# Patient Record
Sex: FEMALE | Employment: FULL TIME | URBAN - METROPOLITAN AREA
[De-identification: names, ages, dates, MRNs, and addresses within clinical notes are randomized per-mention and may not be internally consistent; named-entity substitution may affect disease eponyms.]

---

## 2017-03-13 RX ORDER — TOPIRAMATE 100 MG/1
TABLET, COATED ORAL
Qty: 135 TAB | Refills: 1 | Status: SHIPPED | OUTPATIENT
Start: 2017-03-13 | End: 2017-08-24 | Stop reason: SDUPTHER

## 2017-03-13 NOTE — TELEPHONE ENCOUNTER
Future Appointments  Date Time Provider Yani Janis   8/18/2017 8:40 Carmita Gutiérrez MD [address]                         Last Appointment My Department:  8/23/2016    Please advise of refill below.   Requested Prescriptions     Pending Prescriptions Disp Refills    TOPAMAX 100 mg tablet [Pharmacy Med Name: TOPAMAX 100MG TABLETS] 135 Tab 1     Sig: TAKE 1& 1/2 TABLETS BY MOUTH NIGHTLY

## 2017-06-12 ENCOUNTER — DOCUMENTATION ONLY (OUTPATIENT)
Dept: NEUROLOGY | Age: 52
End: 2017-06-12

## 2017-06-12 RX ORDER — BUTALBITAL, ASPIRIN, AND CAFFEINE 325; 50; 40 MG/1; MG/1; MG/1
CAPSULE ORAL
Qty: 30 CAP | Refills: 0 | Status: SHIPPED | OUTPATIENT
Start: 2017-06-12 | End: 2018-01-30 | Stop reason: SDUPTHER

## 2017-08-18 ENCOUNTER — TELEPHONE (OUTPATIENT)
Dept: NEUROLOGY | Age: 52
End: 2017-08-18

## 2017-08-18 NOTE — LETTER
NOTIFICATION RETURN TO WORK / SCHOOL 
 
8/18/2017 8:13 AM 
 
Ms. Corky Romo [address] [facility] [address] [address] To Whom It May Concern: 
 
Corky Romo is currently under the care of [address] [place] [address]. She will return to work/school on: 8/18/17 If there are questions or concerns please have the patient contact our office. Sincerely, Chon Ward MD

## 2017-08-18 NOTE — LETTER
NOTIFICATION RETURN TO WORK / SCHOOL 
 
8/18/2017 8:14 AM 
 
Ms. Masoud Lerma [address] [facility] 250 [address] [address] To Whom It May Concern: 
 
Masoud Lerma is currently under the care of [address] [place] [address]. She will return to work/school on: 8/18/17 If there are questions or concerns please have the patient contact our office.  
 
 
 
Sincerely, 
 
 
Laura Brooks LPN

## 2017-08-24 RX ORDER — TOPIRAMATE 100 MG/1
TABLET, COATED ORAL
Qty: 135 TAB | Refills: 0 | Status: SHIPPED | OUTPATIENT
Start: 2017-08-24 | End: 2018-01-28 | Stop reason: SDUPTHER

## 2017-08-29 ENCOUNTER — OFFICE VISIT (OUTPATIENT)
Dept: NEUROLOGY | Age: 52
End: 2017-08-29

## 2017-08-29 DIAGNOSIS — G43.109 MIGRAINE WITH AURA AND WITHOUT STATUS MIGRAINOSUS, NOT INTRACTABLE: Primary | ICD-10-CM

## 2017-08-29 NOTE — PATIENT INSTRUCTIONS
[facility]   Statement to Patients  April 1, 2014      In an effort to ensure the large volume of patient prescription refills is processed in the most efficient and expeditious manner, we are asking our patients to assist us by calling your Pharmacy for all prescription refills, this will include also your  Mail Order Pharmacy. The pharmacy will contact our office electronically to continue the refill process. Please do not wait until the last minute to call your pharmacy. We need at least 48 hours (2days) to fill prescriptions. We also encourage you to call your pharmacy before going to  your prescription to make sure it is ready. With regard to controlled substance prescription refill requests (narcotic refills) that need to be picked up at our office, we ask your cooperation by providing us with at least 72 hours (3days) notice that you will need a refill. We will not refill narcotic prescription refill requests after 4:00pm on any weekday, Monday through Thursday, or after 2:00pm on Fridays, or on the weekends. We encourage everyone to explore another way of getting your prescription refill request processed using [employer], our patient web portal through our electronic medical record system. [employer] is an efficient and effective way to communicate your medication request directly to the office and  downloadable as an nikkie on your smart phone . [employer] also features a review functionality that allows you to view your medication list as well as leave messages for your physician. Are you ready to get connected? If so please review the attatched instructions or speak to any of our staff to get you set up right away! Thank you so much for your cooperation. Should you have any questions please contact our Practice Administrator.     The Physicians and Staff,  [facility]          A Healthy Lifestyle: Care Instructions  Your Care Instructions  A healthy lifestyle can help you feel good, stay at a healthy weight, and have plenty of energy for both work and play. A healthy lifestyle is something you can share with your whole family. A healthy lifestyle also can lower your risk for serious health problems, such as high blood pressure, heart disease, and diabetes. You can follow a few steps listed below to improve your health and the health of your family. Follow-up care is a key part of your treatment and safety. Be sure to make and go to all appointments, and call your doctor if you are having problems. Its also a good idea to know your test results and keep a list of the medicines you take. How can you care for yourself at home? · Do not eat too much sugar, fat, or fast foods. You can still have dessert and treats now and then. The goal is moderation. · Start small to improve your eating habits. Pay attention to portion sizes, drink less juice and soda pop, and eat more fruits and vegetables. ¨ Eat a healthy amount of food. A 3-ounce serving of meat, for example, is about the size of a deck of cards. Fill the rest of your plate with vegetables and whole grains. ¨ Limit the amount of soda and sports drinks you have every day. Drink more water when you are thirsty. ¨ Eat at least 5 servings of fruits and vegetables every day. It may seem like a lot, but it is not hard to reach this goal. A serving or helping is 1 piece of fruit, 1 cup of vegetables, or 2 cups of leafy, raw vegetables. Have an apple or some carrot sticks as an afternoon snack instead of a candy bar. Try to have fruits and/or vegetables at every meal.  · Make exercise part of your daily routine. You may want to start with simple activities, such as walking, bicycling, or slow swimming. Try to be active 30 to 60 minutes every day. You do not need to do all 30 to 60 minutes all at once. For example, you can exercise 3 times a day for 10 or 20 minutes.  Moderate exercise is safe for most people, but it is always a good idea to talk to your doctor before starting an exercise program.  · Keep moving. Dian Katz the lawn, work in the garden, or [employer]. Take the stairs instead of the elevator at work. · If you smoke, quit. People who smoke have an increased risk for heart attack, stroke, cancer, and other lung illnesses. Quitting is hard, but there are ways to boost your chance of quitting tobacco for good. ¨ Use nicotine gum, patches, or lozenges. ¨ Ask your doctor about stop-smoking programs and medicines. ¨ Keep trying. In addition to reducing your risk of diseases in the future, you will notice some benefits soon after you stop using tobacco. If you have shortness of breath or asthma symptoms, they will likely get better within a few weeks after you quit. · Limit how much alcohol you drink. Moderate amounts of alcohol (up to 2 drinks a day for men, 1 drink a day for women) are okay. But drinking too much can lead to liver problems, high blood pressure, and other health problems. Family health  If you have a family, there are many things you can do together to improve your health. · Eat meals together as a family as often as possible. · Eat healthy foods. This includes fruits, vegetables, lean meats and dairy, and whole grains. · Include your family in your fitness plan. Most people think of activities such as jogging or tennis as the way to fitness, but there are many ways you and your family can be more active. Anything that makes you breathe hard and gets your heart pumping is exercise. Here are some tips:  ¨ Walk to do errands or to take your child to school or the bus. ¨ Go for a family bike ride after dinner instead of watching TV. Where can you learn more? Go to http://wolfgang-perlita.info/. Enter I229 in the search box to learn more about \"A Healthy Lifestyle: Care Instructions. \"  Current as of: July 26, 2016  Content Version: 11.3  © 7716-1222 [employer]. Care instructions adapted under license by [employer] (which disclaims liability or warranty for this information). If you have questions about a medical condition or this instruction, always ask your healthcare professional. [address] any warranty or liability for your use of this information.

## 2017-08-29 NOTE — PROGRESS NOTES
Date:            2017 migraine. She reports that her migraines have been well controlled for some time, she presents today    Name:  Zuleyma Snider  :  1965  MRN:  [id number]     PCP:  Selena Alonzo MD    Chief Complaint   Patient presents with    Follow-up    Migraine         HISTORY OF PRESENT ILLNESS:  Raymond Rainey is a 47 y.o., female who presents today for follow up for really just to check in and get refills. She has been on Topamax for years for preventative, it works well for her and she denies side effects of that medication. She has changed her diet, eats  in general.  She has lost weight and increase exercise. She has identified a lot of her triggers, including diet triggers and fluorescent lights. She will feel an aura and have visual change before the headache, she takes Fioricet as soon as she feels that and it works well to abort her headache. If she misses the start of a migraine, she might have a headache for weeks that she can get rid of. This happens rarely. She is only had one headache in the past 6 months, this was triggered by heat. She takes 100 Topamax at bedtime, denies drowsiness, cognitive changes, tingling. Current Outpatient Prescriptions   Medication Sig    TOPAMAX 100 mg tablet TAKE 1& 1/2 TABLETS BY MOUTH NIGHTLY    butalbital-aspirin-caffeine (FIORINAL) capsule TAKE 1 CAPSULE BY MOUTH EVERY 8 HOURS AS NEEDED FOR PAIN FOR HEADACHE    butalbital-aspirin-caffeine (FIORINAL) -40 mg tablet Take 1 Tab by mouth every eight (8) hours as needed for Pain or Headache.  valACYclovir (VALTREX) 500 mg tablet Take 1,000 mg by mouth daily. No current facility-administered medications for this visit. No Known Allergies  Past Medical History:   Diagnosis Date    Migraine      History reviewed. No pertinent surgical history.   Social History     Social History    Marital status:      Spouse name: N/A    Number of children: N/A    Years of education: N/A     Occupational History    Not on file. Social History Main Topics    Smoking status: Never Smoker    Smokeless tobacco: Never Used    Alcohol use No    Drug use: Not on file    Sexual activity: Not on file     Other Topics Concern    Not on file     Social History Narrative     Family History   Problem Relation Age of Onset   Marko Urena MS Mother     Cancer Father     Other Sister     Cancer Maternal Grandmother     Dementia Maternal Grandfather     Cancer Paternal Grandmother     Cancer Paternal Grandfather          PHYSICAL EXAMINATION:    There were no vitals taken for this visit. General:  Well defined, nourished, and groomed individual in no acute distress. Neck: Supple, nontender, no bruits, no pain with resistance to active range of motion. Heart: Regular rate and rhythm, no murmurs, rub, or gallop. Normal S1S2. Lungs:  Clear to auscultation bilaterally with equal chest expansion, no cough, no wheeze  Musculoskeletal:  Extremities revealed no edema and had full range of motion of joints. Psych:  Good mood and bright affect    NEUROLOGICAL EXAMINATION:     Mental Status:   Alert and oriented to person, place, and time with recent and remote memory intact. Attention span and concentration are normal. Speech is fluent with a full fund of knowledge. Cranial Nerves:    II, III, IV, VI:  Visual acuity grossly intact. Extra-ocular movements are full and fluid. No ptosis or nystagmus. V-XII: Hearing is grossly intact. Facial features are symmetric, with normal sensation and strength. The palate rises symmetrically and the tongue protrudes midline. Sternocleidomastoids 5/5. Motor Examination: Normal tone, bulk, and strength, 5/5 muscle strength throughout. Coordination:  Finger to nose testing was normal.   No resting or intention tremor  Gait and Station:  Steady while walking and with tandem walking. Normal arm swing.   No pronator drift.   No muscle wasting or fasciculations noted. ASSESSMENT AND PLAN    ICD-10-CM ICD-9-CM    1. Migraine with aura and without status migrainosus, not intractable G43.109 346.00      31-year-old female seen in follow-up for migraines, she currently has good control. She thinks she has only had one headache in the past 6 months. If she takes her Fiorinal as soon as she feels headache creep up, she can generally get rid of it. If she misses that window, it may escalate and lasts for a few weeks. 1.  Continue Topamax 100 mg nightly for preventative, patient has no side effects in this medication  2. Continue Fiorinal as needed for abortive therapy  3. Continue avoidance of triggers, exercise, avoidance of heat to prevent migraines    Follow-up in 1 year, call sooner with concerns      Concha Sorto NP    This note was created using voice recognition software. Despite editing, there may be syntax errors.

## 2017-09-21 ENCOUNTER — HOSPITAL ENCOUNTER (OUTPATIENT)
Dept: MAMMOGRAPHY | Age: 52
Discharge: HOME | End: 2017-09-21
Attending: OBSTETRICS & GYNECOLOGY
Payer: COMMERCIAL

## 2017-09-21 DIAGNOSIS — Z12.31 VISIT FOR SCREENING MAMMOGRAM: ICD-10-CM

## 2017-09-21 PROCEDURE — 77067 SCR MAMMO BI INCL CAD: CPT

## 2018-01-29 RX ORDER — TOPIRAMATE 100 MG/1
100 TABLET, COATED ORAL DAILY
Qty: 90 TAB | Refills: 3 | Status: SHIPPED | OUTPATIENT
Start: 2018-01-29 | End: 2018-08-28 | Stop reason: SDUPTHER

## 2018-01-30 DIAGNOSIS — R51.9 NONINTRACTABLE HEADACHE, UNSPECIFIED CHRONICITY PATTERN, UNSPECIFIED HEADACHE TYPE: Primary | ICD-10-CM

## 2018-01-30 RX ORDER — BUTALBITAL, ASPIRIN, AND CAFFEINE 325; 50; 40 MG/1; MG/1; MG/1
CAPSULE ORAL
Qty: 30 CAP | Refills: 0 | Status: SHIPPED | OUTPATIENT
Start: 2018-01-30 | End: 2018-08-28

## 2018-02-05 ENCOUNTER — DOCUMENTATION ONLY (OUTPATIENT)
Dept: NEUROLOGY | Age: 53
End: 2018-02-05

## 2018-02-06 ENCOUNTER — TELEPHONE (OUTPATIENT)
Dept: NEUROLOGY | Age: 53
End: 2018-02-06

## 2018-02-06 NOTE — TELEPHONE ENCOUNTER
----- Message from [facility] sent at 2/6/2018  8:47 AM EST -----  Regarding: NP Wood/ Refill  The pt's insurance company faxed over a form two weeks ago. They need an explanation on why the pt is takiing \"Topamax\" instead of the genetic brand. She is is requesting a call back to see if the doctor has responded yet, because she almost out of the medication. Best contact number is  [phone].

## 2018-02-07 NOTE — TELEPHONE ENCOUNTER
Patient can't take generic Topamax because she it causes vision and eye problems. Patient drives for a living. Patient will be out of the brand Topamax in about 1 week.     Will hold until Dr. Camilo Bahena is in the office

## 2018-02-08 NOTE — TELEPHONE ENCOUNTER
Needed a prior authorization. Filled out on cover my meds and sent 2/8/18 at 11:43am  Outcome:Request Reference Number: [id number]. TOPAMAX TAB 100MG is approved through 02/08/2019.      Called melecio to give all of this information

## 2018-08-28 ENCOUNTER — OFFICE VISIT (OUTPATIENT)
Dept: NEUROLOGY | Age: 53
End: 2018-08-28

## 2018-08-28 VITALS
OXYGEN SATURATION: 98 % | HEART RATE: 64 BPM | HEIGHT: 62 IN | SYSTOLIC BLOOD PRESSURE: 110 MMHG | WEIGHT: 139 LBS | BODY MASS INDEX: 25.58 KG/M2 | DIASTOLIC BLOOD PRESSURE: 72 MMHG

## 2018-08-28 DIAGNOSIS — G43.109 MIGRAINE WITH AURA AND WITHOUT STATUS MIGRAINOSUS, NOT INTRACTABLE: ICD-10-CM

## 2018-08-28 RX ORDER — BUTALBITAL, ASPIRIN AND CAFFEINE 50; 325; 40 MG/1; MG/1; MG/1
1 TABLET ORAL
Qty: 10 TAB | Refills: 11 | Status: SHIPPED | OUTPATIENT
Start: 2018-08-28 | End: 2020-07-07

## 2018-08-28 RX ORDER — TOPIRAMATE 100 MG/1
100 TABLET, COATED ORAL DAILY
Qty: 90 TAB | Refills: 3 | Status: SHIPPED | OUTPATIENT
Start: 2018-08-28 | End: 2019-05-28 | Stop reason: DRUGHIGH

## 2018-08-28 NOTE — LETTER
NOTIFICATION RETURN TO WORK / SCHOOL 
 
8/28/2018 9:22 AM 
 
Ms. Inés Apple [address] [place] [address] [address] [address] To Whom It May Concern: 
 
Inés Apple is currently under the care of [address] [place] [address]. The patient was seen in the office today, 8/28/2018 She will return to work/school on: 8/28/2018 If there are questions or concerns please have the patient contact our office. Sincerely, Doreen Vallejo MD

## 2018-08-28 NOTE — PROGRESS NOTES
NEUROLOGY follow-up note Chief Complaint Patient presents with  Follow-up  
  migraine SUBJECTIVE: 
Tony Murcia is a 47 y.o. female who presented to the neurology office for management of migraines. She started having headaches around 2012. She has the onset with an aura of seeing black dots and after that she has a holocephalic headache, moderate to severe in intensity and associated with photo and phonophobia. Her triggers are florescent lights and heat. She sometimes has numbness of her hands and decreased peripheral vision. Her headache can last for a few days. She is taking topamax 100 mg a day and fiorinal as needed. Risk Factors for headaches Smoking: denies Coffee: 1 cups/day Tea: 0 cups/day Soda: 0 cans/day Water: 20 glasses/day Sleeps at 9 pm and wakes up at 4 am.   
 
 
Current Outpatient Prescriptions Medication Sig  TOPAMAX 100 mg tablet Take 1 Tab by mouth daily.  butalbital-aspirin-caffeine (FIORINAL) -40 mg tablet Take 1 Tab by mouth every eight (8) hours as needed for Pain or Headache.  valACYclovir (VALTREX) 500 mg tablet Take 1,000 mg by mouth daily. No current facility-administered medications for this visit. No Known Allergies Past Medical History:  
Diagnosis Date  Migraine No past surgical history on file. Family History Problem Relation Age of Onset  MS Mother  Cancer Father  Other Sister  Cancer Maternal Grandmother  Dementia Maternal Grandfather  Cancer Paternal Grandmother  Cancer Paternal Grandfather Social History Substance Use Topics  Smoking status: Never Smoker  Smokeless tobacco: Never Used  Alcohol use No  
 
 
REVIEW OF SYSTEMS:  
A ten system review of constitutional, cardiovascular, respiratory,musculoskeletal, endocrine, skin, SHEENT, genitourinary, psychiatric and neurologic systems was obtained and is unremarkable with the exception of the following: headaches EXAMINATION:  
Visit Vitals  /72  Pulse 64  Ht 5' 2\" (1.575 m)  Wt 139 lb (63 kg)  SpO2 98%  BMI 25.42 kg/m2 General:  
General appearance: Pt is in no acute distress Distal pulses are preserved Neurological Examination:  
Mental Status: AAO x3. Speech is fluent. Follows commands, has normal fund of knowledge, attention, short term recall, comprehension and insight. Cranial Nerves: Visual fields are full. PERRL, Extraocular movements are full. Facial sensation intact V1- V3. Facial movement intact, symmetric. Hearing intact to conversation. Palate elevates symmetrically. Shoulder shrug symmetric. Tongue midline. Motor: Strength is 5/5 in all 4 ext. No atrophy. Tone: Normal 
 
Sensation: Normal to light touch Coordination/Cerebellar: Intact to finger-nose-finger Gait: Romberg is negative and casual gait is normal.  
 
Laboratory review: No results found for this or any previous visit. Imaging review: MRI brain on 10/23/16 was normal 
 
Documentation review: 
None Assessment/Plan:  
Wilbert Ruvalcaba is a 47 y.o. female who presented to the neurology office for management of migraine with aura. She is on topamax 100 mg po qhs and fiorinal as needed. Headaches are well controlled. Continue the same. She has an average one headache a month for which she takes Fiorinal. 
 
Fu in 1 yr. 
 
1. Migraine with aura and without status migrainosus, not intractable - butalbital-aspirin-caffeine (FIORINAL) -40 mg tablet; Take 1 Tab by mouth every eight (8) hours as needed for Pain or Headache. Dispense: 30 Tab; Refill: 5 Over 25 minutes was spent with the patient of which > 50% of the visit was spent counseling on diagnosis, management, and treatment of the diagnosis. I did discuss about Fiorinal, Topamax and discussed about her migraines and answered questions. Thank you for allowing me to participate in the care of Ms. Bonds. Please feel free to contact me if you have any questions. Brooklyn Rogers MD 
Neurologist and Clinical Neurophysiologist 
 
CC: Awilda Mayer MD 
 
This note will not be viewable in [address]

## 2018-08-28 NOTE — MR AVS SNAPSHOT
[address] [address], 
[id number], [address] [facility]. 
[phone] Patient: Alize Gambino MRN: [id number] :1965 Visit Information Date & Time Provider Department Dept. Phone Encounter #  
 2018  8:40 AM Baldomero Crawford MD [facility] at [facility] [phone] [id number] Your Appointments 9/3/2019  8:40 AM  
Follow Up with Baldomero Crawford MD  
Neurology Clinic at [facility]) Appt Note: follow up headaches 18 [address] [facility], 
[address] [place] [address], [address] [address]  
[address] [facility], [address] [place] [address], [address] [place] [address] [address] [address] Upcoming Health Maintenance Date Due DTaP/Tdap/Td series (1 - Tdap) 3/28/1987 PAP AKA CERVICAL CYTOLOGY 3/28/1987 FOBT Q 1 YEAR AGE 50-75 3/28/2016 Influenza Age 5 to Adult 2018 BREAST CANCER SCRN MAMMOGRAM 2019 Allergies as of 2018  Review Complete On: 2018 By: Baldomero Crawford MD  
 No Known Allergies Current Immunizations  Never Reviewed No immunizations on file. Not reviewed this visit You Were Diagnosed With   
  
 Codes Comments Migraine with aura and without status migrainosus, not intractable     ICD-10-CM: G43.109 ICD-9-CM: 346.00 Vitals BP Pulse Height(growth percentile) Weight(growth percentile) SpO2 BMI  
 110/72 64 5' 2\" (1.575 m) 139 lb (63 kg) 98% 25.42 kg/m2 OB Status Smoking Status Having regular periods Never Smoker Vitals History BMI and BSA Data Body Mass Index Body Surface Area  
 25.42 kg/m 2 1.66 m 2 Preferred Pharmacy Pharmacy Name Phone [facility] [phone], [address] AT [facility] [phone] Your Updated Medication List  
  
   
 This list is accurate as of 8/28/18  9:19 AM.  Always use your most recent med list.  
  
  
  
  
 butalbital-aspirin-caffeine -40 mg tablet Commonly known as:  Chuy Miyamoto Take 1 Tab by mouth every eight (8) hours as needed for Pain or Headache. TOPAMAX 100 mg tablet Generic drug:  topiramate Take 1 Tab by mouth daily. VALTREX 500 mg tablet Generic drug:  valACYclovir Take 1,000 mg by mouth daily. Prescriptions Printed Refills  
 butalbital-aspirin-caffeine (FIORINAL) -40 mg tablet 11 Sig: Take 1 Tab by mouth every eight (8) hours as needed for Pain or Headache. Class: Print Route: Oral  
  
Prescriptions Sent to Pharmacy Refills TOPAMAX 100 mg tablet 3 Sig: Take 1 Tab by mouth daily. Class: Normal  
 Pharmacy: [facility] [address] [place] [address] #: [phone] Route: Oral  
  
Patient Instructions Follow-up in 1 year Office Policies · Phone calls/patient messages: Please allow up to 24 hours for someone in the office to contact you about your call or message. Be mindful your provider may be out of the office or your message may require further review. We encourage you to use [employer] for your messages as this is a faster, more efficient way to communicate with our office · Medication Refills: 
Prescription medications require up to 48 business hours to process. We encourage you to use [employer] for your refills. For controlled medications: Please allow up to 72 business hours to process. Certain medications may require you to  a written prescription at our office. NO narcotic/controlled medications will be prescribed after 4pm Monday through Friday or on weekends · Form/Paperwork Completion: 
Please note there is a $25 fee for all paperwork completed by our providers. We ask that you allow 7-14 business days.  Pre-payment is due prior to picking up/faxing the completed form. You may also download your forms to [employer] to have your doctor print off. Introducing [facility]! Dear Pink Brunner: 
Thank you for requesting a [employer] account. Our records indicate that you already have an active [employer] account. You can access your account anytime at [email or web address] Did you know that you can access your hospital and ER discharge instructions at any time in [employer]? You can also review all of your test results from your hospital stay or ER visit. Additional Information If you have questions, please visit the Frequently Asked Questions section of the [employer] website at [email or web address] Remember, [employer] is NOT to be used for urgent needs. For medical emergencies, dial 911. Now available from your iPhone and Android! Please provide this summary of care documentation to your next provider. Your primary care clinician is listed as CHERYL WICK. If you have any questions after today's visit, please call [phone].

## 2018-09-25 ENCOUNTER — HOSPITAL ENCOUNTER (OUTPATIENT)
Dept: MAMMOGRAPHY | Age: 53
Discharge: HOME | End: 2018-09-25
Attending: OBSTETRICS & GYNECOLOGY
Payer: COMMERCIAL

## 2018-09-25 DIAGNOSIS — Z12.39 SCREENING BREAST EXAMINATION: ICD-10-CM

## 2018-09-25 PROCEDURE — 77067 SCR MAMMO BI INCL CAD: CPT

## 2019-03-31 DIAGNOSIS — G43.109 MIGRAINE WITH AURA AND WITHOUT STATUS MIGRAINOSUS, NOT INTRACTABLE: Primary | ICD-10-CM

## 2019-04-01 RX ORDER — BUTALBITAL, ASPIRIN, AND CAFFEINE 325; 50; 40 MG/1; MG/1; MG/1
CAPSULE ORAL
Qty: 10 CAP | Refills: 0 | Status: SHIPPED | OUTPATIENT
Start: 2019-04-01 | End: 2020-07-07 | Stop reason: ALTCHOICE

## 2019-05-28 DIAGNOSIS — G43.109 MIGRAINE WITH AURA AND WITHOUT STATUS MIGRAINOSUS, NOT INTRACTABLE: Primary | ICD-10-CM

## 2019-05-28 DIAGNOSIS — G43.109 MIGRAINE WITH AURA AND WITHOUT STATUS MIGRAINOSUS, NOT INTRACTABLE: ICD-10-CM

## 2019-05-28 RX ORDER — TOPIRAMATE 100 MG/1
100 CAPSULE, EXTENDED RELEASE ORAL DAILY
Qty: 30 CAP | Refills: 11 | Status: SHIPPED | OUTPATIENT
Start: 2019-05-28 | End: 2019-05-28 | Stop reason: DRUGHIGH

## 2019-05-28 RX ORDER — TOPIRAMATE 100 MG/1
100 CAPSULE, EXTENDED RELEASE ORAL DAILY
Qty: 30 CAP | Refills: 11 | Status: SHIPPED | OUTPATIENT
Start: 2019-05-28 | End: 2019-06-03 | Stop reason: DRUGHIGH

## 2019-06-03 DIAGNOSIS — G43.109 MIGRAINE WITH AURA AND WITHOUT STATUS MIGRAINOSUS, NOT INTRACTABLE: Primary | ICD-10-CM

## 2019-06-03 RX ORDER — TOPIRAMATE 100 MG/1
100 TABLET, COATED ORAL DAILY
Qty: 30 TAB | Refills: 5 | Status: SHIPPED | OUTPATIENT
Start: 2019-06-03 | End: 2019-06-14 | Stop reason: SDUPTHER

## 2019-06-14 ENCOUNTER — TELEPHONE (OUTPATIENT)
Dept: NEUROLOGY | Age: 54
End: 2019-06-14

## 2019-06-14 DIAGNOSIS — G43.109 MIGRAINE WITH AURA AND WITHOUT STATUS MIGRAINOSUS, NOT INTRACTABLE: ICD-10-CM

## 2019-06-14 RX ORDER — TOPIRAMATE 100 MG/1
100 TABLET, COATED ORAL DAILY
Qty: 30 TAB | Refills: 5 | Status: SHIPPED | OUTPATIENT
Start: 2019-06-14 | End: 2020-07-07 | Stop reason: ALTCHOICE

## 2019-06-17 ENCOUNTER — TELEPHONE (OUTPATIENT)
Dept: NEUROLOGY | Age: 54
End: 2019-06-17

## 2019-06-17 NOTE — TELEPHONE ENCOUNTER
PA for Topamax done on Friday via phone. Approval to 6/13/20. Case [id number]. Faxed to [facility] on 6/14/19.   Nurse aware of approval.

## 2019-09-26 ENCOUNTER — HOSPITAL ENCOUNTER (OUTPATIENT)
Dept: MAMMOGRAPHY | Age: 54
Discharge: HOME | End: 2019-09-26
Attending: OBSTETRICS & GYNECOLOGY
Payer: COMMERCIAL

## 2019-09-26 DIAGNOSIS — Z12.31 VISIT FOR SCREENING MAMMOGRAM: ICD-10-CM

## 2019-09-26 PROCEDURE — 77067 SCR MAMMO BI INCL CAD: CPT

## 2019-12-24 DIAGNOSIS — G43.109 MIGRAINE WITH AURA AND WITHOUT STATUS MIGRAINOSUS, NOT INTRACTABLE: ICD-10-CM

## 2019-12-31 RX ORDER — BUTALBITAL, ASPIRIN, AND CAFFEINE 325; 50; 40 MG/1; MG/1; MG/1
CAPSULE ORAL
Qty: 10 CAP | OUTPATIENT
Start: 2019-12-31

## 2020-07-07 ENCOUNTER — VIRTUAL VISIT (OUTPATIENT)
Dept: NEUROLOGY | Age: 55
End: 2020-07-07

## 2020-07-07 DIAGNOSIS — G43.109 MIGRAINE WITH AURA AND WITHOUT STATUS MIGRAINOSUS, NOT INTRACTABLE: Primary | ICD-10-CM

## 2020-07-07 RX ORDER — BUTALBITAL, ASPIRIN AND CAFFEINE 50; 325; 40 MG/1; MG/1; MG/1
1 TABLET ORAL
Qty: 10 TAB | Refills: 2 | Status: SHIPPED | OUTPATIENT
Start: 2020-07-07

## 2020-07-07 RX ORDER — TOPIRAMATE 50 MG/1
50 TABLET, FILM COATED ORAL DAILY
Qty: 30 TAB | Refills: 2 | Status: SHIPPED | OUTPATIENT
Start: 2020-07-07 | End: 2020-10-02

## 2020-07-07 NOTE — PROGRESS NOTES
NEUROLOGY follow-up note      Chief Complaint   Patient presents with    Follow-up     2 migraine    Headache       SUBJECTIVE:  Spenser Hinton is a 48 y.o. female who presented to the neurology office for management of migraines. She started having headaches around 2012. She has the onset with an aura of seeing black dots and after that she has a holocephalic headache, moderate to severe in intensity and associated with photo and phonophobia. Her triggers are florescent lights and heat. She sometimes has numbness of her hands and decreased peripheral vision. Her headache can last for a few days. She is taking topamax 100 mg a day and fiorinal as needed. Interval history:  Her headaches are very well controlled but in the last 1 month the headaches have worsened. She has had 2 headaches in the last 2 weeks. She gets a headache when it is hot. Risk Factors for headaches  Smoking: denies  Coffee: 1 cups/day  Tea: 0 cups/day  Soda: 0 cans/day  Water: 20 glasses/day  Sleeps at 9 pm and wakes up at 4 am.      Medications tried  Abortive  Imitrex  Fiorinal     Preventative  Topamax    Current Outpatient Medications   Medication Sig    butalbital-aspirin-caffeine (FIORINAL) -40 mg tablet Take 1 Tab by mouth every eight (8) hours as needed for Pain or Headache.  valACYclovir (VALTREX) 500 mg tablet Take 1,000 mg by mouth daily. No current facility-administered medications for this visit. No Known Allergies  Past Medical History:   Diagnosis Date    Migraine      No past surgical history on file.   Family History   Problem Relation Age of Onset   Yusuf Hollingsworth MS Mother     Cancer Father     Other Sister     Cancer Maternal Grandmother     Dementia Maternal Grandfather     Cancer Paternal Grandmother     Cancer Paternal Grandfather      Social History     Tobacco Use    Smoking status: Never Smoker    Smokeless tobacco: Never Used   Substance Use Topics    Alcohol use: No    Drug use: Not on file       REVIEW OF SYSTEMS:   A ten system review of constitutional, cardiovascular, respiratory,musculoskeletal, endocrine, skin, SHEENT, genitourinary, psychiatric and neurologic systems was obtained and is unremarkable with the exception of the following: headaches     EXAMINATION:   There were no vitals taken for this visit. General:  Exam limited because of virtual visit. General appearance: Pt is in no acute distress     Neurological Examination:   Mental Status: AAO x3. Speech is fluent. Follows commands, has normal fund of knowledge, attention, short term recall, comprehension and insight. Cranial Nerves:  Extraocular movements are full. Facial movement intact. Hearing intact to conversation. Shoulder shrug symmetric. Tongue midline. Motor: Strength is symmetric in all 4 ext. Sensation: Normal according to patient to light touch    Coordination/Cerebellar: Intact to finger-nose-finger     Gait: Casual gait is normal.     Laboratory review:   No results found for this or any previous visit. Imaging review:   MRI brain on 10/23/16 was normal    Documentation review:  None    Assessment/Plan:   Paula Montaño is a 48 y.o. female who presented to the neurology office for management of migraine with aura. She was on Topamax 100 mg p.o. nightly and Fiorinal but has not seen me in around a couple of years and did run out of her medications. She has not been on them for more than a year. She has started having headaches back again and did have 2 headaches in the last couple of weeks. I am going to restart the patient on Topamax 50 mg p.o. nightly and Fiorinal as needed. Follow-up in 3 months         Over 25 minutes was spent with the patient of which > 50% of the visit was spent counseling on diagnosis, management, and treatment of the diagnosis. I did discuss about Fiorinal, Topamax and discussed about her migraines and answered questions.         Thank you for allowing me to participate in the care of Ms. Bonds. Please feel free to contact me if you have any questions. Selvin Hunter MD  Neurologist and Clinical Neurophysiologist    CC:  Sayra Jama MD    This note will not be viewable in [address] Bentley Law was seen by synchronous (real-time) audio-video technology on 07/07/20. Consent:  She and/or her healthcare decision maker is aware that this patient-initiated Telehealth encounter is a billable service, with coverage as determined by her insurance carrier. She is aware that she may receive a bill and has provided verbal consent to proceed: Yes    I was in the office while conducting this encounter. Pursuant to the emergency declaration under the [facility], [facility]5 waiver authority and the [employer] Act, this Virtual  Visit was conducted, with patient's consent, to reduce the patient's risk of exposure to COVID-19 and provide continuity of care for an established patient. Services were provided through a video synchronous discussion virtually to substitute for in-person clinic visit.

## 2020-07-07 NOTE — PATIENT INSTRUCTIONS

## 2020-10-02 DIAGNOSIS — G43.109 MIGRAINE WITH AURA AND WITHOUT STATUS MIGRAINOSUS, NOT INTRACTABLE: ICD-10-CM

## 2020-10-02 RX ORDER — TOPIRAMATE 50 MG/1
TABLET, COATED ORAL
Qty: 30 TAB | Refills: 2 | Status: SHIPPED | OUTPATIENT
Start: 2020-10-02 | End: 2020-12-31 | Stop reason: SDUPTHER

## 2020-10-15 ENCOUNTER — HOSPITAL ENCOUNTER (OUTPATIENT)
Dept: MAMMOGRAPHY | Age: 55
Discharge: HOME | End: 2020-10-15
Attending: OBSTETRICS & GYNECOLOGY
Payer: COMMERCIAL

## 2020-10-15 DIAGNOSIS — Z12.31 VISIT FOR SCREENING MAMMOGRAM: ICD-10-CM

## 2020-10-15 PROCEDURE — 77067 SCR MAMMO BI INCL CAD: CPT

## 2020-12-31 DIAGNOSIS — G43.109 MIGRAINE WITH AURA AND WITHOUT STATUS MIGRAINOSUS, NOT INTRACTABLE: ICD-10-CM

## 2021-01-06 RX ORDER — TOPIRAMATE 50 MG/1
TABLET, FILM COATED ORAL
Qty: 30 TAB | Refills: 2 | Status: SHIPPED | OUTPATIENT
Start: 2021-01-06

## 2023-05-10 RX ORDER — VALACYCLOVIR HYDROCHLORIDE 500 MG/1
1000 TABLET, FILM COATED ORAL DAILY
COMMUNITY

## 2023-05-10 RX ORDER — TOPIRAMATE 50 MG/1
1 TABLET, FILM COATED ORAL DAILY
COMMUNITY
Start: 2021-01-06

## 2023-05-10 RX ORDER — BUTALBITAL, ASPIRIN AND CAFFEINE 50; 325; 40 MG/1; MG/1; MG/1
1 TABLET ORAL EVERY 8 HOURS PRN
COMMUNITY
Start: 2020-07-07